# Patient Record
Sex: MALE | Race: BLACK OR AFRICAN AMERICAN | URBAN - METROPOLITAN AREA
[De-identification: names, ages, dates, MRNs, and addresses within clinical notes are randomized per-mention and may not be internally consistent; named-entity substitution may affect disease eponyms.]

---

## 2021-08-20 ENCOUNTER — WEB ENCOUNTER (OUTPATIENT)
Dept: URBAN - METROPOLITAN AREA CLINIC 40 | Facility: CLINIC | Age: 46
End: 2021-08-20

## 2021-08-20 ENCOUNTER — OFFICE VISIT (OUTPATIENT)
Dept: URBAN - METROPOLITAN AREA CLINIC 40 | Facility: CLINIC | Age: 46
End: 2021-08-20
Payer: COMMERCIAL

## 2021-08-20 VITALS
BODY MASS INDEX: 27.36 KG/M2 | TEMPERATURE: 97.7 F | SYSTOLIC BLOOD PRESSURE: 132 MMHG | DIASTOLIC BLOOD PRESSURE: 90 MMHG | WEIGHT: 202 LBS | HEIGHT: 72 IN | HEART RATE: 74 BPM

## 2021-08-20 DIAGNOSIS — Z86.19 HISTORY OF HEPATITIS C: ICD-10-CM

## 2021-08-20 DIAGNOSIS — R79.89 ELEVATED LFTS: ICD-10-CM

## 2021-08-20 DIAGNOSIS — Z12.11 ENCOUNTER FOR SCREENING COLONOSCOPY: ICD-10-CM

## 2021-08-20 PROCEDURE — 99203 OFFICE O/P NEW LOW 30 MIN: CPT | Performed by: PHYSICIAN ASSISTANT

## 2021-08-20 RX ORDER — METHOCARBAMOL 500 MG/1
1.5 TABLETS TABLET ORAL
Status: DISCONTINUED | COMMUNITY

## 2021-08-20 RX ORDER — NAPROXEN 500 MG/1
1 TABLET WITH FOOD OR MILK AS NEEDED TABLET ORAL
Status: DISCONTINUED | COMMUNITY

## 2021-08-20 RX ORDER — SODIUM, POTASSIUM,MAG SULFATES 17.5-3.13G
ONCE SOLUTION, RECONSTITUTED, ORAL ORAL
Qty: 1 KIT | Refills: 0 | OUTPATIENT
Start: 2021-08-23 | End: 2021-08-24

## 2021-08-20 NOTE — HPI-TODAY'S VISIT:
Mr. Almonte is a 46-year-old black male who presents to the office today with a remote history of chronic hepatitis C, diagnosed after prior IV drug use.  He was living in [place], [place] when he was treated for chronic hepatitis C with 12 weeks of Harvoni therapy with SVR reportedly achieved.  However, he returns to the office today stating that his primary medical doctor has checked blood work and that he may have hepatitis C again versus solely elevated liver enzymes.  He is unsure of which this may be.  I do see in [facility] that he was seen at [facility] back in April for evaluation of chest pain.  Blood work at that time included a CBC which was essentially normal as well as a BMP.  I do not see hepatic function panel at that time.  He believes he may have been told that he had positive antibodies.  He was unaware that his antibodies would remain positive even after successful treatment with direct acting antiviral. No recent liver imaging and believes he was told previously there was some "scarring". He has no GI complaints today.  Denies abdominal pain change in bowel habits or rectal bleeding.  Former smoker.  Occasional alcohol.  No current IV or recreational drug use.  Denies a prior history of colon cancer screening.  Gives no family history colorectal cancer.

## 2021-08-26 LAB
ACTIN (SMOOTH MUSCLE) ANTIBODY: 9
ALPHA-1-ANTITRYPSIN, SERUM: 107
ANA DIRECT: NEGATIVE
HBSAG SCREEN: NEGATIVE
HCV LOG10: (no result)
HEP A AB, IGM: NEGATIVE
HEP A AB, TOTAL: NEGATIVE
HEP B CORE AB, IGM: NEGATIVE
HEP B CORE AB, TOT: NEGATIVE
HEP B SURFACE AB, QUAL: NON REACTIVE
HEP BE AB: NEGATIVE
HEP BE AG: NEGATIVE
HEPATITIS C QUANTITATION: <12
HEREDITARY  HEMOCHROMATOSIS: (no result)
IGG, IMMUNOGLOBULIN G (RDL): 1342
MITOCHONDRIAL (M2) ANTIBODY: <20
TEST INFORMATION:: (no result)

## 2021-09-13 ENCOUNTER — OFFICE VISIT (OUTPATIENT)
Dept: URBAN - METROPOLITAN AREA CLINIC 73 | Facility: CLINIC | Age: 46
End: 2021-09-13

## 2021-10-08 ENCOUNTER — OFFICE VISIT (OUTPATIENT)
Dept: URBAN - METROPOLITAN AREA SURGERY CENTER 30 | Facility: SURGERY CENTER | Age: 46
End: 2021-10-08
Payer: COMMERCIAL

## 2021-10-08 PROCEDURE — G8907 PT DOC NO EVENTS ON DISCHARG: HCPCS | Performed by: INTERNAL MEDICINE

## 2021-10-08 PROCEDURE — 45378 DIAGNOSTIC COLONOSCOPY: CPT | Performed by: INTERNAL MEDICINE

## 2021-10-25 ENCOUNTER — DASHBOARD ENCOUNTERS (OUTPATIENT)
Age: 46
End: 2021-10-25

## 2021-10-25 ENCOUNTER — OFFICE VISIT (OUTPATIENT)
Dept: URBAN - METROPOLITAN AREA CLINIC 40 | Facility: CLINIC | Age: 46
End: 2021-10-25
Payer: COMMERCIAL

## 2021-10-25 VITALS
WEIGHT: 202 LBS | SYSTOLIC BLOOD PRESSURE: 130 MMHG | HEIGHT: 72 IN | BODY MASS INDEX: 27.36 KG/M2 | TEMPERATURE: 96.8 F | DIASTOLIC BLOOD PRESSURE: 86 MMHG | HEART RATE: 87 BPM

## 2021-10-25 DIAGNOSIS — R79.89 ELEVATED LFTS: ICD-10-CM

## 2021-10-25 DIAGNOSIS — Z12.11 ENCOUNTER FOR SCREENING COLONOSCOPY: ICD-10-CM

## 2021-10-25 DIAGNOSIS — Z86.19 HISTORY OF HEPATITIS C: ICD-10-CM

## 2021-10-25 PROCEDURE — 99213 OFFICE O/P EST LOW 20 MIN: CPT | Performed by: INTERNAL MEDICINE

## 2021-11-03 ENCOUNTER — LAB OUTSIDE AN ENCOUNTER (OUTPATIENT)
Dept: URBAN - METROPOLITAN AREA CLINIC 40 | Facility: CLINIC | Age: 46
End: 2021-11-03

## 2021-11-05 LAB
A/G RATIO: 1.9
ALBUMIN: 5.2
ALKALINE PHOSPHATASE: 72
ALT (SGPT): 28
AST (SGOT): 27
BILIRUBIN, TOTAL: 1
BUN/CREATININE RATIO: 23
BUN: 19
CALCIUM: 9.6
CARBON DIOXIDE, TOTAL: 22
CHLORIDE: 101
CREATININE: 0.81
EGFR IF AFRICN AM: 122
EGFR IF NONAFRICN AM: 106
GLOBULIN, TOTAL: 2.8
GLUCOSE: 103
HCV LOG10: (no result)
HEPATITIS C QUANTITATION: (no result)
POTASSIUM: 4.1
PROTEIN, TOTAL: 8
SODIUM: 138
TEST INFORMATION:: (no result)